# Patient Record
Sex: FEMALE | Race: WHITE | URBAN - METROPOLITAN AREA
[De-identification: names, ages, dates, MRNs, and addresses within clinical notes are randomized per-mention and may not be internally consistent; named-entity substitution may affect disease eponyms.]

---

## 2022-12-03 ENCOUNTER — OFFICE (OUTPATIENT)
Dept: URBAN - METROPOLITAN AREA CLINIC 92 | Facility: CLINIC | Age: 32
Setting detail: OPHTHALMOLOGY
End: 2022-12-03
Payer: COMMERCIAL

## 2022-12-03 DIAGNOSIS — H01.004: ICD-10-CM

## 2022-12-03 DIAGNOSIS — H01.001: ICD-10-CM

## 2022-12-03 PROCEDURE — 92004 COMPRE OPH EXAM NEW PT 1/>: CPT | Performed by: OPHTHALMOLOGY

## 2022-12-03 ASSESSMENT — LID EXAM ASSESSMENTS
OS_BLEPHARITIS: LUL 1+
OD_BLEPHARITIS: RUL 1+

## 2022-12-03 ASSESSMENT — KERATOMETRY
OD_AXISANGLE_DEGREES: 106
OD_K1POWER_DIOPTERS: 41.75
OS_AXISANGLE_DEGREES: 089
OD_K2POWER_DIOPTERS: 42.00
METHOD_AUTO_MANUAL: AUTO
OS_K1POWER_DIOPTERS: 42.25
OS_K2POWER_DIOPTERS: 42.75

## 2022-12-03 ASSESSMENT — REFRACTION_AUTOREFRACTION
OS_SPHERE: -2.00
OS_AXIS: 101
OS_CYLINDER: +0.25
OD_SPHERE: -2.50
OD_AXIS: 167
OD_CYLINDER: +0.50

## 2022-12-03 ASSESSMENT — CONFRONTATIONAL VISUAL FIELD TEST (CVF)
OS_FINDINGS: FULL
OD_FINDINGS: FULL

## 2022-12-03 ASSESSMENT — SPHEQUIV_DERIVED
OS_SPHEQUIV: -1.875
OD_SPHEQUIV: -2.25

## 2022-12-03 ASSESSMENT — AXIALLENGTH_DERIVED
OS_AL: 24.7433
OD_AL: 25.1636

## 2022-12-03 ASSESSMENT — VISUAL ACUITY
OS_BCVA: 20/20
OD_BCVA: 20/20

## 2022-12-03 ASSESSMENT — TONOMETRY
OD_IOP_MMHG: 17
OS_IOP_MMHG: 16

## 2022-12-03 ASSESSMENT — REFRACTION_CURRENTRX
OS_CYLINDER: -0.75
OS_AXIS: 176
OD_OVR_VA: 20/
OD_AXIS: 180
OS_OVR_VA: 20/
OD_SPHERE: -1.75
OS_SPHERE: -1.00
OD_CYLINDER: 0.00

## 2023-06-28 ENCOUNTER — EMERGENCY (EMERGENCY)
Facility: HOSPITAL | Age: 33
LOS: 1 days | Discharge: HOME | End: 2023-06-28
Attending: EMERGENCY MEDICINE
Payer: COMMERCIAL

## 2023-06-28 VITALS
HEIGHT: 62 IN | HEART RATE: 75 BPM | RESPIRATION RATE: 18 BRPM | OXYGEN SATURATION: 98 % | SYSTOLIC BLOOD PRESSURE: 134 MMHG | DIASTOLIC BLOOD PRESSURE: 88 MMHG | WEIGHT: 175.05 LBS | TEMPERATURE: 98 F

## 2023-06-28 PROCEDURE — 99283 EMERGENCY DEPT VISIT LOW MDM: CPT

## 2023-06-28 RX ORDER — CEPHALEXIN 500 MG
1 CAPSULE ORAL
Qty: 28 | Refills: 0
Start: 2023-06-28 | End: 2023-07-04

## 2023-06-28 RX ORDER — CEPHALEXIN 500 MG
500 CAPSULE ORAL ONCE
Refills: 0 | Status: COMPLETED | OUTPATIENT
Start: 2023-06-28 | End: 2023-06-28

## 2023-06-28 RX ADMIN — Medication 500 MILLIGRAM(S): at 11:48

## 2023-06-28 NOTE — ED PROVIDER NOTE - ATTENDING CONTRIBUTION TO CARE
Dr. Dinh: I have personally performed a face to face bedside history and physical examination of this patient. I have discussed the history, examination, review of systems, assessment and plan of management with the fellow. I have reviewed the electronic medical record and amended it to reflect my history, review of systems, physical exam, assessment and plan.    Dr. Dinh: This H&P has been written by myself in its entirety

## 2023-06-28 NOTE — ED PROVIDER NOTE - PHYSICAL EXAMINATION
Gen: No acute distress  HEENT: Mucous membranes moist, pink conjunctivae, EOMI, +minimal edema and warmth of left upper eyelid, no drainage from eye.   CV: RRR, no clubbing/cyanosis/edema  Resp: CTAB  GI: Abdomen soft, NT, ND. Normal BS. No rebound, no guarding  : No CVAT  Neuro: A&O x 3, moving all 4 extremities  MSK: No spine or joint tenderness to palpation  Skin: No rashes Gen: No acute distress  HEENT: Mucous membranes moist, pink conjunctivae, EOMI, +minimal edema and warmth of left upper eyelid, no drainage from eye. no fb noted under the lid, flurosecein staining neg for update or seidels, no stye or chazaion appreciated, no papilla in the lower lid mucosa   CV: RRR, no clubbing/cyanosis/edema  Resp: CTAB  GI: Abdomen soft, NT, ND. Normal BS. No rebound, no guarding  : No CVAT  Neuro: A&O x 3, moving all 4 extremities  MSK: No spine or joint tenderness to palpation  Skin: No rashes

## 2023-06-28 NOTE — ED PROVIDER NOTE - CLINICAL SUMMARY MEDICAL DECISION MAKING FREE TEXT BOX
Pt with preseptal cellulitis of left eye, orbital cellulitis unlikely, pt needs strep coverage, despite allergy to PCN pt states she will take Keflex. Will dc with Keflex and bactrim

## 2023-06-28 NOTE — ED PROVIDER NOTE - NSFOLLOWUPINSTRUCTIONS_ED_ALL_ED_FT
Keflex 500mg 4 times a day for 7 days  Continue your Bactrim twice a day as prescribed  Follow up with Ophtho in 1-2 days  Return to the ED for worsening pain, redness, swelling, vision complaints, fevers or chills or any concerns  ***  Preseptal Cellulitis, Adult  Preseptal cellulitis is an infection of the eyelid and the tissues around the eye (periorbital area). The infection causes painful swelling and redness. This condition may also be called periorbital cellulitis.    In most cases, the condition can be treated with antibiotic medicine at home. It is important to treat preseptal cellulitis right away so that it does not get worse. If it gets worse, it can spread to the eye socket and eye muscles (orbital cellulitis). Orbital cellulitis is a medical emergency.    What are the causes?  Preseptal cellulitis is most commonly caused by bacteria. In rare cases, it can be caused by a virus or fungus. The germs that cause preseptal cellulitis may come from:  A sinus infection that spreads near the eyes.  An injury near the eye, such as a scratch, puncture wound, animal bite, or insect bite.  A skin rash, such as eczema or poison ivy, that becomes infected.  An infected pimple on the eyelid (stye).  Infection after eyelid surgery or injury.  What increases the risk?  You are more likely to develop this condition if:  You have a weakened disease-fighting system (immune system).  You have a medical condition that raises your risk for sinus infections, such as nasal polyps.  What are the signs or symptoms?    Symptoms of this condition include:  Eyelids that are red and swollen and feel unusually hot.  Fever.  Difficulty opening the eye.  Headache.  Pain in the face.  Symptoms of this condition usually develop suddenly.    How is this diagnosed?  This condition may be diagnosed based on your symptoms, your medical history, and an eye exam. You may also have tests, such as:  Blood tests.  Tests (cultures) to find out which specific bacteria are causing the infection. You may have a culture of any open wound or drainage.  CT scan.  MRI. This is less common.  How is this treated?  This condition is treated with antibiotic medicines. These may be given by mouth (orally), through an IV, or as an injection. In rare cases, you may need surgery to drain an infected area.    Follow these instructions at home:  Medicines    Take your antibiotic medicine as told by your health care provider. Do not stop taking the antibiotic even if you start to feel better  Take over-the-counter and prescription medicines only as told by your health care provider.  Eye Care    Do not use eye drops without first getting approval from your health care provider.  Do not touch or rub your eye. If you wear contact lenses, do not wear them until your health care provider approves.  Keep the eye area clean and dry.  Wash the eye area with a clean washcloth, warm water, and baby shampoo or mild soap.  To help relieve discomfort, place a clean washcloth that is wet with warm water over your eye. Leave the washcloth on for a few minutes, then remove it.  General instructions    Wash your hands with soap and water often for at least 20 seconds. If soap and water are not available, use hand .  Do not use any products that contain nicotine or tobacco, such as cigarettes, e-cigarettes, and chewing tobacco. If you need help quitting, ask your health care provider.  Drink enough fluid to keep your urine pale yellow.  Do not drive or operate machinery until your health care provider says that it is safe. Ask your health care provider if it is safe for you to drive.  Stay up to date on your vaccinations.  Keep all follow-up visits. This includes any visits with an eye specialist (ophthalmologist) or dentist. This is important.  Get help right away if:  You have new symptoms.  Your symptoms get worse or do not get better with treatment.  You have a fever.  Your vision becomes blurry or gets worse in any way.  Your eye looks like it is sticking out or bulging out (proptosis).  You develop double vision.  You have trouble moving your eyes or pain when moving your eyes  You have a severe headache.  You have neck stiffness or severe neck pain.  These symptoms may represent a serious problem that is an emergency. Do not wait to see if the symptoms will go away. Get medical help right away. Call your local emergency services (911 in the [place] Do not drive yourself to the hospital.    Summary  Preseptal cellulitis is an infection of the eyelid and the tissues around the eye.  Symptoms of preseptal cellulitis usually develop suddenly and include red and swollen eyelids, fever, difficulty opening the eye, headache, and facial pain.  This condition is treated with antibiotic medicines. Do not stop taking the antibiotic even if you start to feel better.  Preseptal cellulitis can develop into orbital cellulitis, which is a medical emergency. If your condition does not improve or worsens, visit your lisa care provider right away.  This information is not intended to replace advice given to you by your health care provider. Make sure you discuss any questions you have with your health care provider.

## 2023-06-28 NOTE — ED PROVIDER NOTE - PATIENT PORTAL LINK FT
You can access the FollowMyHealth Patient Portal offered by [facility] by registering at the following website: [email or web address]. By joining [employer]s FollowMyHealth portal, you will also be able to view your health information using other applications (apps) compatible with our system.

## 2023-06-28 NOTE — ED PROVIDER NOTE - OBJECTIVE STATEMENT
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  No known trauma, usually wears contacts but has not been, initially attributed symptoms to irritation from contacts.  No actual eye irritation or pain.  Noticed minimal purulent drainage.  Has been on Bactrim for 2 days but symptoms got worse so she came in.  No pain with extraocular movements, no fevers or chills, no other complaints.

## 2023-06-28 NOTE — ED ADULT NURSE NOTE - NSFALLUNIVINTERV_ED_ALL_ED
Bed/Stretcher in lowest position, wheels locked, appropriate side rails in place/Call bell, personal items and telephone in reach/Instruct patient to call for assistance before getting out of bed/chair/stretcher/Non-slip footwear applied when patient is off stretcher/Sneads to call system/Physically safe environment - no spills, clutter or unnecessary equipment/Purposeful proactive rounding/Room/bathroom lighting operational, light cord in reach

## 2023-06-28 NOTE — ED ADULT NURSE NOTE - OBJECTIVE STATEMENT
31 yo presents to the ED from home. A&Ox4, ambulatory c/o left eye redness and eyelid swelling x 3 days. wears daily contacts but not often. has used warm compresses without relief. intermittent pain, took Motrin yesterday, mild relief. started on bactrim yesterday. denies fever, chills, nausea, vomiting, abd pain. reports discharge from eye in AM. plan of care discussed. safety and comfort measures maintained.

## 2023-06-28 NOTE — ED ADULT TRIAGE NOTE - CHIEF COMPLAINT QUOTE
left eye redness and eyelid swelling x 3 days s/p wearing contacts for longer than usual   started ABX yesterday (bactrim)

## 2023-12-01 PROBLEM — Z78.9 OTHER SPECIFIED HEALTH STATUS: Chronic | Status: ACTIVE | Noted: 2023-06-28

## 2024-02-01 ENCOUNTER — APPOINTMENT (OUTPATIENT)
Dept: OBGYN | Facility: CLINIC | Age: 34
End: 2024-02-01
Payer: COMMERCIAL

## 2024-02-01 ENCOUNTER — LABORATORY RESULT (OUTPATIENT)
Age: 34
End: 2024-02-01

## 2024-02-01 VITALS
HEIGHT: 62 IN | WEIGHT: 183 LBS | DIASTOLIC BLOOD PRESSURE: 60 MMHG | BODY MASS INDEX: 33.68 KG/M2 | RESPIRATION RATE: 14 BRPM | SYSTOLIC BLOOD PRESSURE: 110 MMHG | HEART RATE: 75 BPM

## 2024-02-01 DIAGNOSIS — Z80.41 FAMILY HISTORY OF MALIGNANT NEOPLASM OF OVARY: ICD-10-CM

## 2024-02-01 DIAGNOSIS — Z87.42 PERSONAL HISTORY OF OTHER DISEASES OF THE FEMALE GENITAL TRACT: ICD-10-CM

## 2024-02-01 DIAGNOSIS — Z01.419 ENCOUNTER FOR GYNECOLOGICAL EXAMINATION (GENERAL) (ROUTINE) W/OUT ABNORMAL FINDINGS: ICD-10-CM

## 2024-02-01 DIAGNOSIS — Z92.29 PERSONAL HISTORY OF OTHER DRUG THERAPY: ICD-10-CM

## 2024-02-01 DIAGNOSIS — Z82.49 FAMILY HISTORY OF ISCHEMIC HEART DISEASE AND OTHER DISEASES OF THE CIRCULATORY SYSTEM: ICD-10-CM

## 2024-02-01 DIAGNOSIS — Z78.9 OTHER SPECIFIED HEALTH STATUS: ICD-10-CM

## 2024-02-01 PROBLEM — Z00.00 ENCOUNTER FOR PREVENTIVE HEALTH EXAMINATION: Status: ACTIVE | Noted: 2024-02-01

## 2024-02-01 PROCEDURE — 99385 PREV VISIT NEW AGE 18-39: CPT

## 2024-02-01 RX ORDER — MULTIVITAMIN
TABLET ORAL
Refills: 0 | Status: ACTIVE | COMMUNITY

## 2024-02-01 NOTE — PLAN
[FreeTextEntry1] : No IUD string: pt aware of this and states last year was sent for sono confirming IUD in situ. Egg freezing: recommend RAYMUNDO consult.

## 2024-02-01 NOTE — HISTORY OF PRESENT ILLNESS
[Previously active] : previously active [TextBox_4] : Last exam 12/2022, pap nml hpv- has Mirena IUD since 3/2022, also had embx at that time secondary to irregular bleeding, nml. Bleeds now q few months with Mirena and lightly. Just ended long term relationship so inquiring about egg freezing. M Aunt breast cancer, maternal first cousin ovarian or uterine cancer, Mother genetic testing negative. moving back to [place] in a few months. [PapSmeardate] : 12/2022

## 2024-02-01 NOTE — PHYSICAL EXAM
[Labia Majora] : normal [Labia Minora] : normal [Appropriately responsive] : appropriately responsive [Alert] : alert [No Acute Distress] : no acute distress [Soft] : soft [Non-tender] : non-tender [Non-distended] : non-distended [No HSM] : No HSM [No Lesions] : no lesions [No Mass] : no mass [Oriented x3] : oriented x3 [Examination Of The Breasts] : a normal appearance [Normal] : normal [No Masses] : no breast masses were palpable [Tenderness] : nontender [Enlarged ___ wks] : not enlarged [Mass ___ cm] : no uterine mass was palpated [Uterine Adnexae] : non-palpable [FreeTextEntry5] : pap done, no IUD string visualized

## 2024-02-02 LAB — CYTOLOGY CVX/VAG DOC THIN PREP: NORMAL

## 2024-02-03 LAB — HPV HIGH+LOW RISK DNA PNL CVX: NOT DETECTED

## 2024-02-16 ENCOUNTER — TRANSCRIPTION ENCOUNTER (OUTPATIENT)
Age: 34
End: 2024-02-16